# Patient Record
Sex: FEMALE | Race: ASIAN | NOT HISPANIC OR LATINO | Employment: STUDENT | URBAN - METROPOLITAN AREA
[De-identification: names, ages, dates, MRNs, and addresses within clinical notes are randomized per-mention and may not be internally consistent; named-entity substitution may affect disease eponyms.]

---

## 2021-01-28 ENCOUNTER — HOSPITAL ENCOUNTER (EMERGENCY)
Facility: HOSPITAL | Age: 21
Discharge: HOME | End: 2021-01-28
Attending: EMERGENCY MEDICINE
Payer: COMMERCIAL

## 2021-01-28 VITALS
HEART RATE: 72 BPM | RESPIRATION RATE: 18 BRPM | WEIGHT: 125 LBS | TEMPERATURE: 98 F | SYSTOLIC BLOOD PRESSURE: 117 MMHG | OXYGEN SATURATION: 98 % | DIASTOLIC BLOOD PRESSURE: 72 MMHG | HEIGHT: 66 IN | BODY MASS INDEX: 20.09 KG/M2

## 2021-01-28 DIAGNOSIS — Z48.02 VISIT FOR SUTURE REMOVAL: Primary | ICD-10-CM

## 2021-01-28 PROCEDURE — 99291 PR CRITICAL CARE, E/M 30-74 MINUTES: ICD-10-PCS | Mod: ,,, | Performed by: NURSE PRACTITIONER

## 2021-01-28 PROCEDURE — 99282 EMERGENCY DEPT VISIT SF MDM: CPT

## 2021-01-28 PROCEDURE — 99291 CRITICAL CARE FIRST HOUR: CPT | Mod: ,,, | Performed by: NURSE PRACTITIONER

## 2024-02-16 ENCOUNTER — TELEPHONE (OUTPATIENT)
Dept: OTOLARYNGOLOGY | Facility: CLINIC | Age: 24
End: 2024-02-16

## 2024-11-11 ENCOUNTER — OFFICE VISIT (OUTPATIENT)
Dept: URGENT CARE | Facility: CLINIC | Age: 24
End: 2024-11-11
Payer: COMMERCIAL

## 2024-11-11 VITALS
OXYGEN SATURATION: 97 % | TEMPERATURE: 98 F | HEART RATE: 80 BPM | BODY MASS INDEX: 20.09 KG/M2 | DIASTOLIC BLOOD PRESSURE: 71 MMHG | HEIGHT: 66 IN | SYSTOLIC BLOOD PRESSURE: 117 MMHG | RESPIRATION RATE: 18 BRPM | WEIGHT: 125 LBS

## 2024-11-11 DIAGNOSIS — S93.401A SPRAIN OF RIGHT ANKLE, UNSPECIFIED LIGAMENT, INITIAL ENCOUNTER: Primary | ICD-10-CM

## 2024-11-11 DIAGNOSIS — M25.571 ACUTE RIGHT ANKLE PAIN: ICD-10-CM

## 2024-11-11 PROCEDURE — 99213 OFFICE O/P EST LOW 20 MIN: CPT | Mod: S$GLB,,, | Performed by: NURSE PRACTITIONER

## 2024-11-11 PROCEDURE — 73610 X-RAY EXAM OF ANKLE: CPT | Mod: RT,S$GLB,, | Performed by: RADIOLOGY

## 2024-11-12 NOTE — PROGRESS NOTES
"Subjective:      Patient ID: Marcela Marc is a 24 y.o. female.    Vitals:  height is 5' 6" (1.676 m) and weight is 56.7 kg (125 lb). Her oral temperature is 98.1 °F (36.7 °C). Her blood pressure is 117/71 and her pulse is 80. Her respiration is 18 and oxygen saturation is 97%.     Chief Complaint: Leg Injury    Pt came in with complaint of a RIGHT ankle injury. Pt twisted her ankle while playing basketball today. Difficult weight bearing. Pt ankle is visibly swollen.  Provider note begins below    Patient reports pain to the right lateral ankle.  Has not tried to bear weight.  She was playing basketball wearing tennis shoes.  Believes that she may have inverted her ankle.  Denies any prior trauma or injury to right ankle.    Other  This is a new problem. The current episode started today. The problem occurs constantly. The problem has been gradually worsening. Associated symptoms include arthralgias and joint swelling. She has tried nothing for the symptoms. The treatment provided no relief.       Musculoskeletal:  Positive for pain, trauma, joint pain and joint swelling.      Objective:     Physical Exam   Constitutional: She is oriented to person, place, and time.   HENT:   Head: Normocephalic and atraumatic.   Cardiovascular: Normal rate.   Pulmonary/Chest: Effort normal. No respiratory distress.   Abdominal: Normal appearance.   Musculoskeletal:      Right ankle: She exhibits decreased range of motion and swelling. Tenderness. Lateral malleolus tenderness found.        Legs:    Neurological: She is alert and oriented to person, place, and time.   Skin: Skin is warm and dry.   Psychiatric: Her behavior is normal. Mood normal.       X-Ray Ankle Complete 3 View Right    Result Date: 11/11/2024  EXAMINATION: THREE VIEWS OF THE RIGHT ANKLE CLINICAL HISTORY: Pain in right ankle and joints of right foot TECHNIQUE: AP, lateral and oblique views of the right ankle COMPARISON: None. FINDINGS: Three views of the right " ankle demonstrate no acute fracture or dislocation.     No acute bony abnormality detected. Electronically signed by: Ann Weeks Date:    11/11/2024 Time:    19:43      Assessment:     1. Sprain of right ankle, unspecified ligament, initial encounter    2. Acute right ankle pain        Plan:   Right ankle x-ray - no fracture noted  Patient would like crutches  Declines ibuprofen in clinic    Rest, Ice, Compress, & Elevate for 24 -48 hours.  Wear the splint as directed.  Follow up with the orthopedist in 1 week if you continue with pain.   Sometimes it can take up to 1 week for stress fractures to show up on an X-ray, and may need reimaging or a CT or MRI of the affected area.       Sprain of right ankle, unspecified ligament, initial encounter    Acute right ankle pain  -     X-Ray Ankle Complete 3 View Right; Future; Expected date: 11/11/2024  -     CRUTCHES FOR HOME USE  -     Ambulatory referral/consult to Orthopedics